# Patient Record
Sex: MALE | Race: OTHER | HISPANIC OR LATINO | URBAN - METROPOLITAN AREA
[De-identification: names, ages, dates, MRNs, and addresses within clinical notes are randomized per-mention and may not be internally consistent; named-entity substitution may affect disease eponyms.]

---

## 2020-10-22 ENCOUNTER — EMERGENCY (EMERGENCY)
Facility: HOSPITAL | Age: 35
LOS: 1 days | Discharge: HOME | End: 2020-10-22
Attending: EMERGENCY MEDICINE | Admitting: EMERGENCY MEDICINE
Payer: SELF-PAY

## 2020-10-22 VITALS
DIASTOLIC BLOOD PRESSURE: 79 MMHG | HEART RATE: 88 BPM | TEMPERATURE: 98 F | SYSTOLIC BLOOD PRESSURE: 126 MMHG | RESPIRATION RATE: 14 BRPM | OXYGEN SATURATION: 100 %

## 2020-10-22 VITALS
HEART RATE: 102 BPM | TEMPERATURE: 99 F | SYSTOLIC BLOOD PRESSURE: 129 MMHG | RESPIRATION RATE: 16 BRPM | OXYGEN SATURATION: 100 % | HEIGHT: 60 IN | WEIGHT: 125 LBS | DIASTOLIC BLOOD PRESSURE: 80 MMHG

## 2020-10-22 PROCEDURE — 73610 X-RAY EXAM OF ANKLE: CPT

## 2020-10-22 PROCEDURE — 99284 EMERGENCY DEPT VISIT MOD MDM: CPT | Mod: 25

## 2020-10-22 PROCEDURE — 73630 X-RAY EXAM OF FOOT: CPT

## 2020-10-22 PROCEDURE — 12011 RPR F/E/E/N/L/M 2.5 CM/<: CPT

## 2020-10-22 PROCEDURE — 29515 APPLICATION SHORT LEG SPLINT: CPT | Mod: LT

## 2020-10-22 PROCEDURE — 73630 X-RAY EXAM OF FOOT: CPT | Mod: 26,LT

## 2020-10-22 PROCEDURE — 73610 X-RAY EXAM OF ANKLE: CPT | Mod: 26,LT

## 2020-10-22 RX ORDER — IBUPROFEN 200 MG
600 TABLET ORAL ONCE
Refills: 0 | Status: COMPLETED | OUTPATIENT
Start: 2020-10-22 | End: 2020-10-22

## 2020-10-22 RX ORDER — TETANUS TOXOID, REDUCED DIPHTHERIA TOXOID AND ACELLULAR PERTUSSIS VACCINE, ADSORBED 5; 2.5; 8; 8; 2.5 [IU]/.5ML; [IU]/.5ML; UG/.5ML; UG/.5ML; UG/.5ML
0.5 SUSPENSION INTRAMUSCULAR ONCE
Refills: 0 | Status: DISCONTINUED | OUTPATIENT
Start: 2020-10-22 | End: 2020-10-22

## 2020-10-22 RX ORDER — OXYCODONE AND ACETAMINOPHEN 5; 325 MG/1; MG/1
1 TABLET ORAL ONCE
Refills: 0 | Status: DISCONTINUED | OUTPATIENT
Start: 2020-10-22 | End: 2020-10-22

## 2020-10-22 RX ADMIN — OXYCODONE AND ACETAMINOPHEN 1 TABLET(S): 5; 325 TABLET ORAL at 15:35

## 2020-10-22 RX ADMIN — Medication 600 MILLIGRAM(S): at 15:35

## 2020-10-22 NOTE — ED PROVIDER NOTE - PHYSICAL EXAMINATION
SPINE: c-spine: supple, no spinal tenderness. no midline tenderness. no paraspinal tenderness. no body step off. no deformity. no muscle spasm. FROM   Thoracic spine: no spinal tenderness. no midline tenderness. no paraspinal tenderness. no body step off. no deformity. no muscle spasm.FROM   LS-spine:no spinal tenderness. no midline tenderness. no paraspinal tenderness. no body step off. no deformity.no muscle spasm. FROM s from x 4. SENSATION GROSSLY INTACT X4.   ms lle:left ankle diffuse swelling extending to foot ttp medially. foot calcaneus ttp extending to ankle. rest of extremity nontender decreased rom in ankle. from of foot and knee. sensation intact + 2 dp

## 2020-10-22 NOTE — ED PROVIDER NOTE - CARE PROVIDER_API CALL
Bobo Slater  ORTHOPAEDIC SURGERY  [address] [place] [address]  Phone: [phone]  Fax: [phone]  Follow Up Time: 4-6 Days

## 2020-10-22 NOTE — ED ADULT TRIAGE NOTE - CHIEF COMPLAINT QUOTE
" I fell from a crane bucket about 4 feet high - I landed on the ground and hurt my left ankle " Pt denies LOC No headache No neck pain Pt complains of pain and deformity of the left ankle - (+) abrasion on nasal  and upper lip area

## 2020-10-22 NOTE — ED PROVIDER NOTE - OBJECTIVE STATEMENT
pt is a 34yo male with no significant pmhx presents s/p fall. pt reports he fell out of a bucket that was 4 feet in the air while cutting branches. pt reports he fell on his left foot/ankle causing pain and deformity. pt reports a branch also fell cutting his nose and lip. pt did not take anything for pain. pt denies fever, head injury, loc, n/v, numbness. pt unsure if tetanus is utd.

## 2020-10-22 NOTE — ED PROVIDER NOTE - CLINICAL SUMMARY MEDICAL DECISION MAKING FREE TEXT BOX
pt is a 34yo male with no significant pmhx presents s/p fall. pt reports he fell out of a bucket that was 4 feet in the air while cutting branches. pt reports he fell on his left foot/ankle causing pain and deformity. pt reports a branch also fell cutting his nose and lip. will do x rays to r/o fx, repair wound, update tetanus, pain control and reeval

## 2020-10-22 NOTE — ED ADULT NURSE NOTE - NSIMPLEMENTINTERV_GEN_ALL_ED
Implemented All Fall Risk Interventions:  [place] to call system. Call bell, personal items and telephone within reach. Instruct patient to call for assistance. Room bathroom lighting operational. Non-slip footwear when patient is off stretcher. Physically safe environment: no spills, clutter or unnecessary equipment. Stretcher in lowest position, wheels locked, appropriate side rails in place. Provide visual cue, wrist band, yellow gown, etc. Monitor gait and stability. Monitor for mental status changes and reorient to person, place, and time. Review medications for side effects contributing to fall risk. Reinforce activity limits and safety measures with patient and family.

## 2020-10-22 NOTE — ED PROVIDER NOTE - PATIENT PORTAL LINK FT
You can access the FollowMyHealth Patient Portal offered by [facility] by registering at the following website: [email or web address]. By joining [facility]’s FollowMyHealth portal, you will also be able to view your health information using other applications (apps) compatible with our system.

## 2020-10-22 NOTE — ED PROVIDER NOTE - ATTENDING CONTRIBUTION TO CARE
pt c/o injuries s/p fall. pt relates was cutting branches while 4 feet up in bucket, a branch fell hit his nose, knocked him down on left foot/ankle. + left ankle pain and swelling. no loc, ha, d/n/v, cp, sob, abd pain, weakness, numbness, neck or back pain, arm pain.  wd, wn, male, nad, + 1 cm laceration to nose, nontender nasal and facial bones, small superficial abrasion above upper lip. perrl, eomi, mmm, s1s2 rrr normal pulses, lungs cta ribs nt, abd soft, nt, no cvat, neck and back nt, ue's nt, full rom, rle nt, full rom, lle hip knee nt, full rom, ankle decreased rom, tender with swelling b/l maleoli, + calcaneal tenderness, distal n/v intact, cap refill < 2 secs all toes

## 2020-10-22 NOTE — ED PROVIDER NOTE - NSFOLLOWUPINSTRUCTIONS_ED_ALL_ED_FT
1) Follow-up with Orthopedics, See referred doctor. Call today / next business day for close, prompt follow-up.  2) Return to Emergency room for any worsening or persistent pain, weakness, numbness, fever, color change to extremity, or any other concerning symptoms.  3) See attached instruction sheets for additional information, including information regarding signs and symptoms to look out for, reasons to seek immediate care and other important instructions.  take ibuprofen 600mg every 6 hours with food as needed for pain.

## 2020-10-22 NOTE — ED PROVIDER NOTE - CARE PLAN
Principal Discharge DX:	Ankle sprain   Principal Discharge DX:	Ankle sprain  Secondary Diagnosis:	Nasal laceration, initial encounter

## 2020-10-22 NOTE — ED PROVIDER NOTE - PROGRESS NOTE DETAILS
pt splinted. advised ortho follow up, rice and motrin for pain. All imaging reviewed. all results reviewed with pt including abnormal results. pt given a copy of results. pt advised to follow up with pmd regarding abnormal results. All questions answered and concerns addressed. pt verbalized understanding and agreement with plan and dx. pt advised on next step and when/where to follow up. pt advised on all take home and otc medications. pt advised to follow up with PMD. pt advised to return to ed for worsenng symptoms including fever, cp, sob. will dc. pt splinted and made nwb with crutches . advised ortho follow up, rice and motrin for pain. All imaging reviewed. all results reviewed with pt including abnormal results. pt given a copy of results. pt advised to follow up with pmd regarding abnormal results. All questions answered and concerns addressed. pt verbalized understanding and agreement with plan and dx. pt advised on next step and when/where to follow up. pt advised on all take home and otc medications. pt advised to follow up with PMD. pt advised to return to ed for worsenng symptoms including fever, cp, sob. will dc. pt splinted and made nwb with crutches . pt refused adacel advised ortho follow up, rice and motrin for pain. All imaging reviewed. all results reviewed with pt including abnormal results. pt given a copy of results. pt advised to follow up with pmd regarding abnormal results. All questions answered and concerns addressed. pt verbalized understanding and agreement with plan and dx. pt advised on next step and when/where to follow up. pt advised on all take home and otc medications. pt advised to follow up with PMD. pt advised to return to ed for worsenng symptoms including fever, cp, sob. will dc.

## 2020-10-22 NOTE — ED ADULT NURSE REASSESSMENT NOTE - NS ED NURSE REASSESS COMMENT FT1
Left ankle splinted by ANTOLIN Landeros, nose  laceration repaired as well. Patient tolerated procedures well.

## 2020-10-22 NOTE — ED ADULT NURSE NOTE - OBJECTIVE STATEMENT
Patient was cutting tree inside a [employer]  bucket, unrestrained. Branch fell and knocked him out of it. Fell onto ground about 4 feet, hit nose and twisted left ankle. Denies LOC, denies chest or abdominal trauma.

## 2020-10-22 NOTE — ED ADULT TRIAGE NOTE - MODE OF ARRIVAL
"Initial BP (!) 156/70   Pulse 72   Temp 98.1  F (36.7  C) (Tympanic)   Resp 16   Ht 1.676 m (5' 6\")   Wt 61.8 kg (136 lb 3.2 oz)   SpO2 100%   BMI 21.98 kg/m   Estimated body mass index is 21.98 kg/m  as calculated from the following:    Height as of this encounter: 1.676 m (5' 6\").    Weight as of this encounter: 61.8 kg (136 lb 3.2 oz). .      " Walk in